# Patient Record
Sex: MALE | Race: WHITE | URBAN - METROPOLITAN AREA
[De-identification: names, ages, dates, MRNs, and addresses within clinical notes are randomized per-mention and may not be internally consistent; named-entity substitution may affect disease eponyms.]

---

## 2017-08-18 ENCOUNTER — EMERGENCY (EMERGENCY)
Facility: HOSPITAL | Age: 47
LOS: 1 days | Discharge: HOME | End: 2017-08-18
Attending: EMERGENCY MEDICINE | Admitting: EMERGENCY MEDICINE
Payer: COMMERCIAL

## 2017-08-18 VITALS
RESPIRATION RATE: 18 BRPM | TEMPERATURE: 99 F | HEART RATE: 78 BPM | OXYGEN SATURATION: 97 % | DIASTOLIC BLOOD PRESSURE: 77 MMHG | SYSTOLIC BLOOD PRESSURE: 126 MMHG | WEIGHT: 192.02 LBS

## 2017-08-18 DIAGNOSIS — G47.00 INSOMNIA, UNSPECIFIED: ICD-10-CM

## 2017-08-18 PROCEDURE — 99283 EMERGENCY DEPT VISIT LOW MDM: CPT | Mod: 25

## 2017-08-18 PROCEDURE — 99283 EMERGENCY DEPT VISIT LOW MDM: CPT

## 2017-08-18 RX ORDER — ALPRAZOLAM 0.25 MG
1 TABLET ORAL
Qty: 15 | Refills: 0 | OUTPATIENT
Start: 2017-08-18

## 2017-08-18 NOTE — ED PROVIDER NOTE - OBJECTIVE STATEMENT
48 y/o m presents to ED with insomnia x 3 days hx of similar symptoms in past.  Pt was on mirtazapine which helped.  Currently denies SI, HI, drug use, etoh use.  Has been using over-the-counter meds.

## 2017-08-18 NOTE — ED PROVIDER NOTE - MEDICAL DECISION MAKING DETAILS
pt with insomnia x several days no other associated psych symptoms - will prescribe short course of xanax and pt to follow up with psych as needed

## 2017-08-18 NOTE — ED ADULT NURSE NOTE - CHPI ED SYMPTOMS NEG
no chills/no decreased eating/drinking/no pain/no nausea/no weakness/no dizziness/no fever/no numbness/no tingling/no vomiting

## 2017-08-18 NOTE — ED ADULT NURSE NOTE - OBJECTIVE STATEMENT
pt walked into ed with steady gait stating "I have not slept since Sunday." Pt states this has happened to him in the past. denies smoking, alcohol use, drug use. denies si/hi. pt states he tried tylenol PM and found no success. pt has not drank coffee in 2-3 days.

## 2023-05-10 NOTE — ED ADULT TRIAGE NOTE - WEIGHT IN LBS
Soolantra Counseling: I discussed with the patients the risks of topial Soolantra. This is a medicine which decreases the number of mites and inflammation in the skin. You experience burning, stinging, eye irritation or allergic reactions.  Please call our office if you develop any problems from using this medication. 192

## 2024-03-02 NOTE — ED PROVIDER NOTE - CPE EDP HEAD NORM PED
HPI   Chief Complaint   Patient presents with    Back Pain     Patient fell on tailbone a week ago and it just isnt getting any better       71-year-old female presents emergency department, patient states about a week ago she fell off of her porch, states she landed on her back.  States she scraped her back, has been managing that with Neosporin.  Patient states that she has been having significant discomfort to her lower back, tailbone area, unable to sit.      Patient states she does have a history of lumbar surgery, states she has some continued numbness on her left thigh from that, although no new changes to this numbness or worsening.  No difficulty with bowels or bladder, no saddle anesthesia.        History provided by:  Patient   used: No                        No data recorded                   Patient History   Past Medical History:   Diagnosis Date    Acute upper respiratory infection, unspecified     Acute URI    Acute upper respiratory infection, unspecified 05/11/2022    URTI (acute upper respiratory infection)    Contact with and (suspected) exposure to covid-19     Suspected COVID-19 virus infection    Cough, unspecified     Cough in adult    Discitis, unspecified, lumbar region     Septic discitis of lumbar region    Elevated blood-pressure reading, without diagnosis of hypertension     Elevated blood pressure reading    Encounter for immunization     Encounter for immunization    Enlarged lymph nodes, unspecified     Swollen gland    Nicotine dependence, unspecified, uncomplicated 12/11/2018    Other chest pain     Chest pressure    Other enthesopathies, not elsewhere classified     Tendinitis of wrist    Pain in unspecified knee 06/09/2022    Knee pain, acute    Pain, unspecified     Generalized body aches    Personal history of other diseases of the musculoskeletal system and connective tissue     History of arthritis    Personal history of other diseases of the  musculoskeletal system and connective tissue     History of spinal stenosis    Personal history of other diseases of the respiratory system     History of allergic rhinitis    Personal history of other diseases of the respiratory system     History of bronchitis    Personal history of other diseases of the respiratory system     History of acute sinusitis    Personal history of other diseases of the respiratory system 2022    History of acute pharyngitis    Personal history of other diseases of the respiratory system 2022    History of acute sinusitis    Personal history of other infectious and parasitic diseases     History of viral infection    Personal history of other specified conditions     History of fatigue    Personal history of other specified conditions     History of urinary frequency    Rash and other nonspecific skin eruption     Rash    Smoker 2017    Vitamin D deficiency, unspecified     Vitamin D insufficiency     Past Surgical History:   Procedure Laterality Date    OTHER SURGICAL HISTORY  2021    Lumbar laminectomy    OTHER SURGICAL HISTORY  2022    Knee surgery    OTHER SURGICAL HISTORY  2022    Spinal surgery    OTHER SURGICAL HISTORY  2022    Arthrodesis    OTHER SURGICAL HISTORY  2022    Colonoscopy    OTHER SURGICAL HISTORY  2022    Lumbar discectomy    OTHER SURGICAL HISTORY  2022    Lumbar vertebral fusion     Family History   Problem Relation Name Age of Onset    Depression Mother      Diabetes Mother      Fibromyalgia Mother      Other (High serum cholesterol sulfate) Father       Social History     Tobacco Use    Smoking status: Former     Packs/day: 1.00     Years: 10.00     Additional pack years: 0.00     Total pack years: 10.00     Types: Cigarettes     Quit date:      Years since quittin.1    Smokeless tobacco: Never   Substance Use Topics    Alcohol use: Yes     Alcohol/week: 2.0 standard drinks of alcohol      Types: 2 Glasses of wine per week    Drug use: Never       Physical Exam   ED Triage Vitals [03/02/24 0914]   Temperature Heart Rate Respirations BP   36.2 °C (97.2 °F) 89 18 132/74      Pulse Ox Temp Source Heart Rate Source Patient Position   98 % Temporal -- --      BP Location FiO2 (%)     -- --       Physical Exam  Back Pain:   Gen.: Vitals noted no distress. Afebrile.   Heart: Regular rate rhythm no murmur.   Lungs: Clear to auscultation bilaterally with good aeration and no adventitious breath sounds.   Abdomen: Soft, nontender, nonsurgical throughout. Normoactive bowel sounds. No pulsatile masses or abdominal bruits to auscultation.   Back: There is tenderness to palpation in the midline and paraspinal planes throughout the LS, especially down over the coccyx. Normal motor sensory, symmetric reflexes, strong equal peripheral pulses, normal Babinski's bilaterally.  Long, linear, very superficial laceration present across the mid to lower back  Skin: No rash.   Neuro: No focal neurologic deficits, NIH score of 0.     ED Course & MDM   Diagnoses as of 03/02/24 1101   Injury of coccyx, initial encounter   Labs Reviewed - No data to display     CT lumbar spine wo IV contrast   Final Result   1. No acute fracture or traumatic malalignment in the lumbar spine.   2. Status post bipedicular rosie and screw fixation at L4-L5 level and   intervertebral disc prosthesis at L5-S1 level without hardware   related complications.   3. Incidental note of nonobstructing right renal calculi.        MACRO:   None        Signed by: Ruba Montes De Oca 3/2/2024 10:52 AM   Dictation workstation:   [id number]             Medical Decision Making  Patient is driving today, discussed Toradol here and CT imaging to evaluate for fracture.    CT scan shows no fracture of the lumbar spine or coccyx.    Discussed with the patient, discussed pain control.  Initially discussed Percocet for the patient, although as a return to my desk to prescribe  but I reviewed the patient's [facility] report, it appears she does get monthly Percocet from her doctor.  Patient states that she is out of them and is due to have them refilled this week.  I discussed that prescription for Percocet today may have waited any contracts that she has signed, patient states that she understands but would like to have Percocet prescribed.    Procedure  Procedures     Deedee Dahl, MELISSA-CNP  03/02/24 1104     Head atraumatic, normal cephalic shape.